# Patient Record
Sex: FEMALE | Race: WHITE | NOT HISPANIC OR LATINO
[De-identification: names, ages, dates, MRNs, and addresses within clinical notes are randomized per-mention and may not be internally consistent; named-entity substitution may affect disease eponyms.]

---

## 2018-11-13 ENCOUNTER — APPOINTMENT (OUTPATIENT)
Dept: ENDOCRINOLOGY | Facility: CLINIC | Age: 78
End: 2018-11-13

## 2018-12-03 ENCOUNTER — LABORATORY RESULT (OUTPATIENT)
Age: 78
End: 2018-12-03

## 2018-12-06 ENCOUNTER — RECORD ABSTRACTING (OUTPATIENT)
Age: 78
End: 2018-12-06

## 2018-12-06 RX ORDER — OMEPRAZOLE 40 MG/1
40 CAPSULE, DELAYED RELEASE ORAL
Refills: 0 | Status: ACTIVE | COMMUNITY

## 2018-12-06 RX ORDER — CALCIUM CITRATE/VITAMIN D3 315MG-6.25
315-250 TABLET ORAL
Refills: 0 | Status: ACTIVE | COMMUNITY

## 2018-12-10 ENCOUNTER — APPOINTMENT (OUTPATIENT)
Dept: ENDOCRINOLOGY | Facility: CLINIC | Age: 78
End: 2018-12-10
Payer: MEDICARE

## 2018-12-10 VITALS
DIASTOLIC BLOOD PRESSURE: 70 MMHG | OXYGEN SATURATION: 98 % | BODY MASS INDEX: 19.69 KG/M2 | SYSTOLIC BLOOD PRESSURE: 120 MMHG | HEART RATE: 89 BPM | WEIGHT: 107 LBS | HEIGHT: 62 IN

## 2018-12-10 LAB — GLUCOSE BLDC GLUCOMTR-MCNC: 261

## 2018-12-10 PROCEDURE — 99214 OFFICE O/P EST MOD 30 MIN: CPT | Mod: 25

## 2018-12-10 PROCEDURE — 82962 GLUCOSE BLOOD TEST: CPT

## 2018-12-10 RX ORDER — PRAVASTATIN SODIUM 10 MG/1
10 TABLET ORAL DAILY
Refills: 0 | Status: DISCONTINUED | COMMUNITY
End: 2018-12-10

## 2019-04-01 ENCOUNTER — LABORATORY RESULT (OUTPATIENT)
Age: 79
End: 2019-04-01

## 2019-04-01 LAB
ALBUMIN SERPL ELPH-MCNC: 4.5 G/DL
ALP BLD-CCNC: 75 U/L
ALT SERPL-CCNC: 13 U/L
ANION GAP SERPL CALC-SCNC: 13 MMOL/L
AST SERPL-CCNC: 20 U/L
BASOPHILS # BLD AUTO: 0.09 K/UL
BASOPHILS NFR BLD AUTO: 1.5 %
BILIRUB SERPL-MCNC: 1 MG/DL
BUN SERPL-MCNC: 21 MG/DL
CALCIUM SERPL-MCNC: 9.9 MG/DL
CHLORIDE SERPL-SCNC: 102 MMOL/L
CHOLEST SERPL-MCNC: 225 MG/DL
CHOLEST/HDLC SERPL: 2.4 RATIO
CO2 SERPL-SCNC: 27 MMOL/L
CREAT SERPL-MCNC: 0.96 MG/DL
EOSINOPHIL # BLD AUTO: 0.14 K/UL
EOSINOPHIL NFR BLD AUTO: 2.4 %
GLUCOSE SERPL-MCNC: 151 MG/DL
HBA1C MFR BLD HPLC: 7 %
HCT VFR BLD CALC: 47.1 %
HDLC SERPL-MCNC: 94 MG/DL
HGB BLD-MCNC: 15.3 G/DL
IMM GRANULOCYTES NFR BLD AUTO: 0.3 %
LDLC SERPL CALC-MCNC: 114 MG/DL
LYMPHOCYTES # BLD AUTO: 1.79 K/UL
LYMPHOCYTES NFR BLD AUTO: 30.1 %
MAN DIFF?: NORMAL
MCHC RBC-ENTMCNC: 30.4 PG
MCHC RBC-ENTMCNC: 32.5 GM/DL
MCV RBC AUTO: 93.5 FL
MONOCYTES # BLD AUTO: 0.41 K/UL
MONOCYTES NFR BLD AUTO: 6.9 %
NEUTROPHILS # BLD AUTO: 3.5 K/UL
NEUTROPHILS NFR BLD AUTO: 58.8 %
PLATELET # BLD AUTO: 231 K/UL
POTASSIUM SERPL-SCNC: 4.2 MMOL/L
PROT SERPL-MCNC: 7.7 G/DL
RBC # BLD: 5.04 M/UL
RBC # FLD: 12.9 %
SODIUM SERPL-SCNC: 142 MMOL/L
T3RU NFR SERPL: 0.9 TBI
T4 SERPL-MCNC: 9.4 UG/DL
TRIGL SERPL-MCNC: 87 MG/DL
TSH SERPL-ACNC: 1.23 UIU/ML
VIT B12 SERPL-MCNC: 578 PG/ML
WBC # FLD AUTO: 5.95 K/UL

## 2019-04-02 LAB
25(OH)D3 SERPL-MCNC: 56.3 NG/ML
CREAT SPEC-SCNC: 146 MG/DL
MICROALBUMIN 24H UR DL<=1MG/L-MCNC: 1.4 MG/DL
MICROALBUMIN/CREAT 24H UR-RTO: 10 MG/G

## 2019-04-09 ENCOUNTER — APPOINTMENT (OUTPATIENT)
Dept: ENDOCRINOLOGY | Facility: CLINIC | Age: 79
End: 2019-04-09

## 2019-04-11 ENCOUNTER — RX RENEWAL (OUTPATIENT)
Age: 79
End: 2019-04-11

## 2019-04-12 ENCOUNTER — MEDICATION RENEWAL (OUTPATIENT)
Age: 79
End: 2019-04-12

## 2019-04-26 ENCOUNTER — APPOINTMENT (OUTPATIENT)
Dept: ENDOCRINOLOGY | Facility: CLINIC | Age: 79
End: 2019-04-26
Payer: MEDICARE

## 2019-04-26 ENCOUNTER — RESULT CHARGE (OUTPATIENT)
Age: 79
End: 2019-04-26

## 2019-04-26 VITALS
SYSTOLIC BLOOD PRESSURE: 120 MMHG | BODY MASS INDEX: 19.51 KG/M2 | HEIGHT: 62 IN | DIASTOLIC BLOOD PRESSURE: 70 MMHG | OXYGEN SATURATION: 98 % | WEIGHT: 106 LBS | HEART RATE: 90 BPM

## 2019-04-26 LAB — GLUCOSE BLDC GLUCOMTR-MCNC: 145

## 2019-04-26 PROCEDURE — 99214 OFFICE O/P EST MOD 30 MIN: CPT | Mod: 25

## 2019-04-26 PROCEDURE — 82962 GLUCOSE BLOOD TEST: CPT

## 2019-04-26 NOTE — HISTORY OF PRESENT ILLNESS
[FreeTextEntry1] : Quality: Type 2 DM\par Severity: controlled\par Duration: over 2 years\par Onset: routine labs\par Modifying Factors: Better with medication \par Associated Symptoms: \par \par Current Regimen:\par Metformin  mg once in am\par \par - Synthroid 125 mcg 1/2 tab - taking appropriate \par \par Self blood sugar monitoring: 3 times a week, no meter, no logs\par per pt. before breakfast blood sugar average 115-120\par \par exercise: walks\par \par Diet:\par B- protein drink, oatmeal with yogurt \par L- sandwich, or goes out\par D- hamburger, chicken, vegetables\par Snacks- fruit, bars\par \par Date of last eye exam: 2018 (-) diabetic retinopathy \par Date of last foot exam: 2019 - last week\par Date of last flu vaccine: 2018\par Date of last Pneumovax: 2019

## 2019-04-26 NOTE — REVIEW OF SYSTEMS
[Heartburn] : heartburn [Fatigue] : no fatigue [Decreased Appetite] : appetite not decreased [Recent Weight Gain (___ Lbs)] : no recent weight gain [Visual Field Defect] : no visual field defect [Recent Weight Loss (___ Lbs)] : no recent weight loss [Blurry Vision] : no blurred vision [Dysphagia] : no dysphagia [Dysphonia] : no dysphonia [Neck Pain] : no neck pain [Chest Pain] : no chest pain [Palpitations] : no palpitations [SOB on Exertion] : no shortness of breath during exertion [Constipation] : no constipation [Diarrhea] : no diarrhea [Polyuria] : no polyuria [Dysuria] : no dysuria [Hair Loss] : no hair loss [Dry Skin] : no dry skin [Headache] : no headaches [Tremors] : no tremors [Depression] : no depression [Anxiety] : no anxiety [Polydipsia] : no polydipsia [Cold Intolerance] : cold tolerant [Heat Intolerance] : heat tolerant [Easy Bruising] : no tendency for easy bruising [Swelling] : no swelling [FreeTextEntry2] : weight stable

## 2019-04-26 NOTE — PHYSICAL EXAM
[Alert] : alert [No Acute Distress] : no acute distress [Well Nourished] : well nourished [Normal Sclera/Conjunctiva] : normal sclera/conjunctiva [EOMI] : extra ocular movement intact [Well Developed] : well developed [Supple] : the neck was supple [Normal Hearing] : hearing was normal [No LAD] : no lymphadenopathy [No Thyroid Nodules] : there were no palpable thyroid nodules [Thyroid Not Enlarged] : the thyroid was not enlarged [Normal Rate and Effort] : normal respiratory rhythm and effort [No Accessory Muscle Use] : no accessory muscle use [Clear to Auscultation] : lungs were clear to auscultation bilaterally [Normal Rate] : heart rate was normal  [Regular Rhythm] : with a regular rhythm [Normal S1, S2] : normal S1 and S2 [Pedal Pulses Normal] : the pedal pulses are present [No Edema] : there was no peripheral edema [Normal Bowel Sounds] : normal bowel sounds [Not Tender] : non-tender [Soft] : abdomen soft [No Rash] : no rash [Normal Gait] : normal gait [Right Foot Was Examined] : right foot ~C was examined [Left Foot Was Examined] : left foot ~C was examined [Normal] : normal [Full ROM] : with full range of motion [No Motor Deficits] : the motor exam was normal [No Tremors] : no tremors [Normal Insight/Judgement] : insight and judgment were intact [Oriented x3] : oriented to person, place, and time [Normal Mood] : the mood was normal [Acanthosis Nigricans] : no acanthosis nigricans [Diminished Throughout Both Feet] : normal tactile sensation with monofilament testing throughout both feet

## 2019-04-26 NOTE — REASON FOR VISIT
[Follow-Up: _____] : a [unfilled] follow-up visit [FreeTextEntry1] : Type 2 DM, Elevated Cholesterol, and Hypothyroidism

## 2019-04-26 NOTE — ASSESSMENT
[FreeTextEntry1] : 79 y/o female with Type 2 DM, Elevated Cholesterol, and Hypothyroidism. Labs reviewed from 4/1/19 - , chol 225, , TSH 1.23, A1C 7.0%, and vitamin D 56\par \par Plan: \par Type 2 DM: continue current medication regimen: Metformin 500 mg daily\par - continue self BS monitoring\par - educated on healthy food choices\par - encouraged to increase routine exercise\par \par Elevated Cholesterol: monitor labs, cholesterol and LDL elevated since last visit \par -  increased Pravastatin to 40 mg daily\par \par Hypothyroidism: monitor labs, continue current dose of Synthroid 125 mcg daily\par \par Labs and follow up visit in 4 months.\par \par Plan reviewed with Dr. Taylor [Importance of Diet and Exercise] : importance of diet and exercise to improve glycemic control, achieve weight loss and improve cardiovascular health

## 2019-04-29 ENCOUNTER — MEDICATION RENEWAL (OUTPATIENT)
Age: 79
End: 2019-04-29

## 2019-08-26 ENCOUNTER — MEDICATION RENEWAL (OUTPATIENT)
Age: 79
End: 2019-08-26

## 2019-08-28 ENCOUNTER — MEDICATION RENEWAL (OUTPATIENT)
Age: 79
End: 2019-08-28

## 2019-10-10 ENCOUNTER — RX RENEWAL (OUTPATIENT)
Age: 79
End: 2019-10-10

## 2019-10-10 ENCOUNTER — MEDICATION RENEWAL (OUTPATIENT)
Age: 79
End: 2019-10-10

## 2019-10-11 ENCOUNTER — MEDICATION RENEWAL (OUTPATIENT)
Age: 79
End: 2019-10-11

## 2019-10-30 ENCOUNTER — MEDICATION RENEWAL (OUTPATIENT)
Age: 79
End: 2019-10-30

## 2019-12-11 ENCOUNTER — LABORATORY RESULT (OUTPATIENT)
Age: 79
End: 2019-12-11

## 2019-12-12 ENCOUNTER — RESULT REVIEW (OUTPATIENT)
Age: 79
End: 2019-12-12

## 2019-12-12 ENCOUNTER — APPOINTMENT (OUTPATIENT)
Dept: ENDOCRINOLOGY | Facility: CLINIC | Age: 79
End: 2019-12-12
Payer: MEDICARE

## 2019-12-12 VITALS
HEART RATE: 89 BPM | SYSTOLIC BLOOD PRESSURE: 120 MMHG | HEIGHT: 62 IN | DIASTOLIC BLOOD PRESSURE: 70 MMHG | WEIGHT: 107 LBS | BODY MASS INDEX: 19.69 KG/M2 | OXYGEN SATURATION: 97 %

## 2019-12-12 LAB
ALBUMIN SERPL ELPH-MCNC: 4.4 G/DL
ALP BLD-CCNC: 76 U/L
ALT SERPL-CCNC: 11 U/L
ANION GAP SERPL CALC-SCNC: 12 MMOL/L
AST SERPL-CCNC: 13 U/L
BASOPHILS # BLD AUTO: 0.06 K/UL
BASOPHILS NFR BLD AUTO: 0.6 %
BILIRUB SERPL-MCNC: 0.5 MG/DL
BUN SERPL-MCNC: 36 MG/DL
CALCIUM SERPL-MCNC: 10.3 MG/DL
CHLORIDE SERPL-SCNC: 102 MMOL/L
CHOLEST SERPL-MCNC: 206 MG/DL
CHOLEST/HDLC SERPL: 2.7 RATIO
CO2 SERPL-SCNC: 27 MMOL/L
CREAT SERPL-MCNC: 1.05 MG/DL
CREAT SPEC-SCNC: 85 MG/DL
EOSINOPHIL # BLD AUTO: 0.11 K/UL
EOSINOPHIL NFR BLD AUTO: 1.1 %
ESTIMATED AVERAGE GLUCOSE: 151 MG/DL
GLUCOSE BLDC GLUCOMTR-MCNC: 184
GLUCOSE SERPL-MCNC: 128 MG/DL
HBA1C MFR BLD HPLC: 6.9 %
HCT VFR BLD CALC: 45.3 %
HDLC SERPL-MCNC: 77 MG/DL
HGB BLD-MCNC: 14.6 G/DL
IMM GRANULOCYTES NFR BLD AUTO: 0.3 %
LDLC SERPL CALC-MCNC: 105 MG/DL
LYMPHOCYTES # BLD AUTO: 1.98 K/UL
LYMPHOCYTES NFR BLD AUTO: 20.1 %
MAN DIFF?: NORMAL
MCHC RBC-ENTMCNC: 30.4 PG
MCHC RBC-ENTMCNC: 32.2 GM/DL
MCV RBC AUTO: 94.2 FL
MICROALBUMIN 24H UR DL<=1MG/L-MCNC: <1.2 MG/DL
MICROALBUMIN/CREAT 24H UR-RTO: NORMAL MG/G
MONOCYTES # BLD AUTO: 0.54 K/UL
MONOCYTES NFR BLD AUTO: 5.5 %
NEUTROPHILS # BLD AUTO: 7.15 K/UL
NEUTROPHILS NFR BLD AUTO: 72.4 %
PLATELET # BLD AUTO: 252 K/UL
POTASSIUM SERPL-SCNC: 4.9 MMOL/L
PROT SERPL-MCNC: 6.9 G/DL
RBC # BLD: 4.81 M/UL
RBC # FLD: 12.4 %
SODIUM SERPL-SCNC: 141 MMOL/L
T3FREE SERPL-MCNC: 2.33 PG/ML
T3RU NFR SERPL: 1 TBI
T4 FREE SERPL-MCNC: 1.3 NG/DL
TRIGL SERPL-MCNC: 120 MG/DL
TSH SERPL-ACNC: 2.24 UIU/ML
WBC # FLD AUTO: 9.87 K/UL

## 2019-12-12 PROCEDURE — 82962 GLUCOSE BLOOD TEST: CPT

## 2019-12-12 PROCEDURE — 99214 OFFICE O/P EST MOD 30 MIN: CPT | Mod: 25

## 2019-12-12 NOTE — HISTORY OF PRESENT ILLNESS
[FreeTextEntry1] : Pt. reports that a lung mass was found on CT scan and has a scheduled appointment with lung specialist. She also had a brain MRI, PET scan and Echo done - all results are pending. \par \par Quality: Type 2 DM\par Severity: controlled\par Duration: over 2 years\par Onset: routine labs\par Modifying Factors: Better with medication \par Associated Symptoms: \par \par Current Regimen:\par Metformin  mg once in am\par \par - Synthroid 125 mcg (VINNY) 1/2 tab - taking appropriate \par \par Self blood sugar monitoring: 3 times a week, no meter, no logs\par per pt. before breakfast blood sugar average 115- 120\par   today \par \par exercise: walks not as much in the winter \par \par Diet:\par B- protein drink, oatmeal with yogurt \par L- sandwich, or goes out\par D- hamburger, chicken, vegetables\par Snacks- fruit, bars\par \par Date of last eye exam: 3 months ago (-) diabetic retinopathy \par Date of last foot exam: 2019 - appointment tomorrow with podiatry - Dr. Escalera \par Date of last flu vaccine: 2019\par Date of last Pneumovax: 2019

## 2019-12-12 NOTE — REVIEW OF SYSTEMS
[Cold Intolerance] : cold intolerant [Recent Weight Gain (___ Lbs)] : no recent weight gain [Decreased Appetite] : appetite not decreased [Fatigue] : no fatigue [Blurry Vision] : no blurred vision [Recent Weight Loss (___ Lbs)] : no recent weight loss [Visual Field Defect] : no visual field defect [Dysphagia] : no dysphagia [Dysphonia] : no dysphonia [Neck Pain] : no neck pain [Chest Pain] : no chest pain [Palpitations] : no palpitations [Diarrhea] : no diarrhea [Constipation] : no constipation [Dysuria] : no dysuria [Polyuria] : no polyuria [Hair Loss] : no hair loss [Dry Skin] : no dry skin [Headache] : no headaches [Tremors] : no tremors [Polydipsia] : no polydipsia [Depression] : no depression [Anxiety] : no anxiety [Heat Intolerance] : heat tolerant [Easy Bruising] : no tendency for easy bruising [Swelling] : no swelling [FreeTextEntry2] : weight stable

## 2019-12-12 NOTE — REASON FOR VISIT
[Follow-Up: _____] : a [unfilled] follow-up visit [Other: _____] : [unfilled] [FreeTextEntry1] : Type 2 DM, Elevated Cholesterol, and Hypothyroidism

## 2019-12-12 NOTE — ASSESSMENT
[FreeTextEntry1] : 80 y/o female with Type 2 DM, Elevated Cholesterol, and Hypothyroidism. Labs reviewed from 12/11/19 - , chol 206, , TSH 2.2, A1C 6.9%, and BUN 36\par \par Plan: \par Follow up with lung specialist r/t lung mass\par \par Type 2 DM: controlled - not a candidate for tight glycemic control based on advanced age \par - continue current medication regimen: Metformin 500 mg daily\par - continue self BS monitoring\par - educated on healthy food choices\par - encouraged to increase routine exercise\par \par Elevated Cholesterol: monitor labs, cholesterol and LDL have improved slightly since last visit \par -  continue Pravastatin to 40 mg daily\par \par Hypothyroidism: euthyroid clinically and biochemically on replacement\par - continue current dose of Synthroid 125 mcg daily\par \par BUN elevated - encouraged to drink more water\par  \par Labs and follow up visit in 3- 4 months.

## 2019-12-12 NOTE — PHYSICAL EXAM
[Alert] : alert [No Acute Distress] : no acute distress [Normal Sclera/Conjunctiva] : normal sclera/conjunctiva [Well Developed] : well developed [Well Nourished] : well nourished [Supple] : the neck was supple [EOMI] : extra ocular movement intact [Normal Hearing] : hearing was normal [Thyroid Not Enlarged] : the thyroid was not enlarged [No Thyroid Nodules] : there were no palpable thyroid nodules [No LAD] : no lymphadenopathy [No Accessory Muscle Use] : no accessory muscle use [Normal Rate and Effort] : normal respiratory rhythm and effort [Clear to Auscultation] : lungs were clear to auscultation bilaterally [Normal Rate] : heart rate was normal  [Regular Rhythm] : with a regular rhythm [Normal S1, S2] : normal S1 and S2 [No Edema] : there was no peripheral edema [Pedal Pulses Normal] : the pedal pulses are present [Not Tender] : non-tender [Normal Bowel Sounds] : normal bowel sounds [Normal Gait] : normal gait [Soft] : abdomen soft [Right Foot Was Examined] : right foot ~C was examined [Acanthosis Nigricans] : no acanthosis nigricans [No Rash] : no rash [Left Foot Was Examined] : left foot ~C was examined [Normal] : normal [No Motor Deficits] : the motor exam was normal [Diminished Throughout Both Feet] : normal tactile sensation with monofilament testing throughout both feet [Full ROM] : with full range of motion [No Tremors] : no tremors [Oriented x3] : oriented to person, place, and time [Normal Mood] : the mood was normal [Normal Insight/Judgement] : insight and judgment were intact

## 2020-03-09 ENCOUNTER — LABORATORY RESULT (OUTPATIENT)
Age: 80
End: 2020-03-09

## 2020-03-13 ENCOUNTER — APPOINTMENT (OUTPATIENT)
Dept: ENDOCRINOLOGY | Facility: CLINIC | Age: 80
End: 2020-03-13
Payer: MEDICARE

## 2020-03-13 VITALS
BODY MASS INDEX: 18.95 KG/M2 | HEIGHT: 62 IN | OXYGEN SATURATION: 97 % | HEART RATE: 86 BPM | DIASTOLIC BLOOD PRESSURE: 70 MMHG | WEIGHT: 103 LBS | SYSTOLIC BLOOD PRESSURE: 102 MMHG

## 2020-03-13 LAB — GLUCOSE BLDC GLUCOMTR-MCNC: 211

## 2020-03-13 PROCEDURE — 82962 GLUCOSE BLOOD TEST: CPT

## 2020-03-13 PROCEDURE — 99214 OFFICE O/P EST MOD 30 MIN: CPT

## 2020-03-13 NOTE — HISTORY OF PRESENT ILLNESS
[FreeTextEntry1] : Pt. reports that a lung mass was found on CT scan and has a scheduled appointment with lung specialist. She also had a brain MRI, PET scan and Echo done. PET scan was negative, CT scan had no concerning findings. ECHO negative. Lung finding reading as possible scarring and no mass currently.  Brain MRI was wnl. \par \par Quality: Type 2 DM\par Severity: controlled\par Duration: over 2 years\par Onset: routine labs\par Modifying Factors: Better with medication \par Associated Symptoms: \par \par Current Regimen:\par Metformin  mg once in am\par \par - Synthroid 125 mcg (VINNY) 1/2 tab - taking appropriate \par \par Self blood sugar monitoring: 3 times a week, no meter, no logs\par per pt. before breakfast blood sugar average 120s \par Current  - ate 20 minutes ago \par \par exercise: walks not as much in the winter \par \par Diet: same \par B- protein drink, oatmeal with yogurt, eggs \par L- sandwich, or goes out\par D- hamburger, chicken, vegetables\par Snacks- fruit, bars\par \par Date of last eye exam: 1 year ago (-) diabetic retinopathy, will schedule appointment  \par Date of last foot exam: 3/2020 with podiatry - Dr. Escalera \par Date of last flu vaccine: 2019\par Date of last Pneumovax: 2019

## 2020-03-13 NOTE — REVIEW OF SYSTEMS
[Fatigue] : no fatigue [Decreased Appetite] : appetite not decreased [Recent Weight Gain (___ Lbs)] : recent [unfilled] ~Ulb weight gain [Visual Field Defect] : no visual field defect [Blurry Vision] : no blurred vision [Dysphagia] : no dysphagia [Dysphonia] : no dysphonia [Neck Pain] : no neck pain [Chest Pain] : no chest pain [Palpitations] : no palpitations [Constipation] : no constipation [Diarrhea] : no diarrhea [Polyuria] : no polyuria [Dysuria] : no dysuria [Hair Loss] : no hair loss [Dry Skin] : no dry skin [Headache] : no headaches [Tremors] : no tremors [Depression] : no depression [Anxiety] : anxiety [Polydipsia] : no polydipsia [Cold Intolerance] : cold tolerant [Heat Intolerance] : heat tolerant [Easy Bruising] : no tendency for easy bruising [Swelling] : no swelling [de-identified] : sometimes

## 2020-03-13 NOTE — ASSESSMENT
[FreeTextEntry1] : 80 y/o female with Type 2 DM, Elevated Cholesterol, and Hypothyroidism. Labs reviewed from 3/9/20 - , chol 214, , TSH 1.41, and A1C 7.1%\par \par Plan: \par Type 2 DM: controlled - not a candidate for tight glycemic control based on advanced age \par - continue current medication regimen: Metformin 500 mg daily\par - continue self blood sugars monitoring\par - educated on healthy food choices\par - encouraged to increase routine exercise\par \par Elevated Cholesterol: monitor labs, cholesterol and LDL are stable  \par - continue Pravastatin 40 mg daily\par \par Hypothyroidism: euthyroid clinically and biochemically on replacement\par - continue current dose of Synthroid 125 mcg half a tab daily \par  \par Labs and follow up visit in 3 months.

## 2020-03-13 NOTE — PHYSICAL EXAM
[Alert] : alert [No Acute Distress] : no acute distress [Well Nourished] : well nourished [Well Developed] : well developed [Normal Sclera/Conjunctiva] : normal sclera/conjunctiva [EOMI] : extra ocular movement intact [Normal Hearing] : hearing was normal [Supple] : the neck was supple [No LAD] : no lymphadenopathy [Thyroid Not Enlarged] : the thyroid was not enlarged [No Thyroid Nodules] : there were no palpable thyroid nodules [Normal Rate and Effort] : normal respiratory rhythm and effort [No Accessory Muscle Use] : no accessory muscle use [Clear to Auscultation] : lungs were clear to auscultation bilaterally [Normal Rate] : heart rate was normal  [Normal S1, S2] : normal S1 and S2 [Regular Rhythm] : with a regular rhythm [Pedal Pulses Normal] : the pedal pulses are present [No Edema] : there was no peripheral edema [Normal Bowel Sounds] : normal bowel sounds [Not Tender] : non-tender [Soft] : abdomen soft [Normal Gait] : normal gait [No Rash] : no rash [Foot Ulcers] : no foot ulcers [Acanthosis Nigricans] : no acanthosis nigricans [Right Foot Was Examined] : right foot ~C was examined [Left Foot Was Examined] : left foot ~C was examined [Normal] : normal [Full ROM] : with full range of motion [Diminished Throughout Both Feet] : normal tactile sensation with monofilament testing throughout both feet [No Motor Deficits] : the motor exam was normal [No Tremors] : no tremors [Oriented x3] : oriented to person, place, and time [Normal Insight/Judgement] : insight and judgment were intact [Normal Mood] : the mood was normal

## 2020-06-11 ENCOUNTER — APPOINTMENT (OUTPATIENT)
Dept: ENDOCRINOLOGY | Facility: CLINIC | Age: 80
End: 2020-06-11

## 2021-07-16 ENCOUNTER — APPOINTMENT (OUTPATIENT)
Dept: ENDOCRINOLOGY | Facility: CLINIC | Age: 81
End: 2021-07-16
Payer: MEDICARE

## 2021-07-16 VITALS
BODY MASS INDEX: 19.88 KG/M2 | SYSTOLIC BLOOD PRESSURE: 132 MMHG | WEIGHT: 108 LBS | OXYGEN SATURATION: 98 % | HEIGHT: 62 IN | DIASTOLIC BLOOD PRESSURE: 60 MMHG | HEART RATE: 95 BPM

## 2021-07-16 LAB — GLUCOSE BLDC GLUCOMTR-MCNC: 243

## 2021-07-16 PROCEDURE — 82962 GLUCOSE BLOOD TEST: CPT

## 2021-07-16 PROCEDURE — 99214 OFFICE O/P EST MOD 30 MIN: CPT | Mod: 25

## 2021-07-16 NOTE — REASON FOR VISIT
[Follow - Up] : a follow-up visit [DM Type 2] : DM Type 2 [Hypothyroidism] : hypothyroidism [Other___] : [unfilled] [Other: _____] : [unfilled]

## 2021-07-16 NOTE — REVIEW OF SYSTEMS
[Recent Weight Gain (___ Lbs)] : recent weight gain: [unfilled] lbs [Fatigue] : no fatigue [Decreased Appetite] : appetite not decreased [Visual Field Defect] : no visual field defect [Blurred Vision] : no blurred vision [Dysphagia] : no dysphagia [Neck Pain] : no neck pain [Dysphonia] : no dysphonia [Chest Pain] : no chest pain [Palpitations] : no palpitations [Constipation] : no constipation [Diarrhea] : no diarrhea [Polyuria] : no polyuria [Dysuria] : no dysuria [Dry Skin] : no dry skin [Hair Loss] : no hair loss [Headaches] : no headaches [Tremors] : no tremors [Depression] : no depression [Anxiety] : no anxiety [Polydipsia] : no polydipsia [Cold Intolerance] : no cold intolerance [Heat Intolerance] : no heat intolerance [Swelling] : no swelling

## 2021-07-16 NOTE — ASSESSMENT
[FreeTextEntry1] : 80 y/o female with Type 2 DM, Elevated Cholesterol, and Hypothyroidism.\par \par Plan: \par Type 2 DM: controlled - not a candidate for tight glycemic control based on advanced age \par - continue current medication regimen: Metformin 500 mg daily\par - continue self blood sugars monitoring\par - educated on healthy food choices\par - encouraged to increase routine exercise\par - repeat A1C in 3 months \par \par Elevated Cholesterol: recently changed to Rosuvastatin \par - continue Rosuvastatin 10 mg daily - per PMD\par - repeat lipids in 3 months \par \par Hypothyroidism: euthyroid clinically and biochemically on replacement\par - continue current dose of Synthroid 125 mcg half a tab daily\par - repeat TFTs in 3 months \par  \par RTO in 3 months. [Levothyroxine] : The patient was instructed to take Levothyroxine on an empty stomach, separate from vitamins, and wait at least 30 minutes before eating

## 2021-07-16 NOTE — PHYSICAL EXAM
[Alert] : alert [Well Nourished] : well nourished [No Acute Distress] : no acute distress [Well Developed] : well developed [Normal Sclera/Conjunctiva] : normal sclera/conjunctiva [EOMI] : extra ocular movement intact [No Neck Mass] : no neck mass was observed [Thyroid Not Enlarged] : the thyroid was not enlarged [No Thyroid Nodules] : no palpable thyroid nodules [No Respiratory Distress] : no respiratory distress [No Accessory Muscle Use] : no accessory muscle use [Normal Rate and Effort] : normal respiratory rate and effort [Clear to Auscultation] : lungs were clear to auscultation bilaterally [Normal S1, S2] : normal S1 and S2 [Normal Rate] : heart rate was normal [Regular Rhythm] : with a regular rhythm [No Edema] : no peripheral edema [Normal Bowel Sounds] : normal bowel sounds [Not Tender] : non-tender [Soft] : abdomen soft [Normal Gait] : normal gait [No Rash] : no rash [Acanthosis Nigricans] : no acanthosis nigricans [Foot Ulcers] : no foot ulcers [Right Foot Was Examined] : right foot ~C was examined [Left Foot Was Examined] : left foot ~C was examined [Normal] : normal [Diminished Throughout Both Feet] : normal tactile sensation with monofilament testing throughout both feet [No Tremors] : no tremors [Oriented x3] : oriented to person, place, and time [Normal Affect] : the affect was normal [Normal Insight/Judgement] : insight and judgment were intact [Normal Mood] : the mood was normal

## 2021-07-16 NOTE — HISTORY OF PRESENT ILLNESS
[FreeTextEntry1] : History: lung mass was found on CT scan and has a scheduled appointment with lung specialist. She also had a brain MRI, PET scan and Echo done. PET scan was negative, CT scan had no concerning findings. ECHO negative. Lung finding reading as possible scarring and no mass currently.  Brain MRI was wnl. \par \par Interval History: no recent illness or hospitalizations \par \par Quality: Type 2 DM\par Severity: controlled\par Duration: over 2 years\par Onset: routine labs\par Modifying Factors: Better with medication \par Associated Symptoms: \par \par Current Regimen:\par Metformin  mg once in am\par \par - Synthroid 125 mcg (VINNY) 1/2 tab - taking appropriate \par \par Self blood sugar monitoring: 3 times a week, no meter, no logs\par per pt. before breakfast blood sugar average 140s\par Current  - after eating half a bagal and oatmeal \par \par A1C 7.3\par \par exercise: walks not as much in the winter \par \par Diet: same \par B- protein drink, oatmeal with yogurt, eggs \par L- sandwich, or goes out\par D- hamburger, chicken, vegetables\par Snacks- fruit, bars\par \par Date of last eye exam: 1 year ago (-) diabetic retinopathy, will schedule appointment  \par Date of last foot exam: 3/2020 with podiatry - Dr. Escalera, appointment tomorrow \par Date of last flu vaccine: 2020\par Date of last Pneumovax: 2019

## 2021-12-23 LAB — HBA1C MFR BLD HPLC: 7

## 2021-12-28 ENCOUNTER — APPOINTMENT (OUTPATIENT)
Dept: ENDOCRINOLOGY | Facility: CLINIC | Age: 81
End: 2021-12-28

## 2022-03-28 ENCOUNTER — APPOINTMENT (OUTPATIENT)
Dept: ENDOCRINOLOGY | Facility: CLINIC | Age: 82
End: 2022-03-28
Payer: MEDICARE

## 2022-03-28 VITALS
OXYGEN SATURATION: 98 % | WEIGHT: 107 LBS | BODY MASS INDEX: 19.69 KG/M2 | HEART RATE: 94 BPM | DIASTOLIC BLOOD PRESSURE: 82 MMHG | SYSTOLIC BLOOD PRESSURE: 142 MMHG | HEIGHT: 62 IN

## 2022-03-28 LAB
GLUCOSE BLDC GLUCOMTR-MCNC: 106
HBA1C MFR BLD HPLC: 7.1

## 2022-03-28 PROCEDURE — 83036 HEMOGLOBIN GLYCOSYLATED A1C: CPT | Mod: QW

## 2022-03-28 PROCEDURE — 82962 GLUCOSE BLOOD TEST: CPT

## 2022-03-28 PROCEDURE — 99214 OFFICE O/P EST MOD 30 MIN: CPT | Mod: 25

## 2022-03-28 RX ORDER — OMEGA-3/DHA/EPA/FISH OIL 300-1000MG
CAPSULE ORAL
Refills: 0 | Status: ACTIVE | COMMUNITY

## 2022-03-28 RX ORDER — PRAVASTATIN SODIUM 40 MG/1
40 TABLET ORAL
Qty: 90 | Refills: 2 | Status: DISCONTINUED | COMMUNITY
Start: 2018-12-10 | End: 2022-03-28

## 2022-03-28 RX ORDER — ROSUVASTATIN CALCIUM 20 MG/1
20 TABLET, FILM COATED ORAL
Qty: 90 | Refills: 0 | Status: ACTIVE | COMMUNITY
Start: 2022-03-28 | End: 1900-01-01

## 2022-03-28 NOTE — PHYSICAL EXAM
[Alert] : alert [Well Nourished] : well nourished [No Acute Distress] : no acute distress [Well Developed] : well developed [Normal Sclera/Conjunctiva] : normal sclera/conjunctiva [No Proptosis] : no proptosis [No Neck Mass] : no neck mass was observed [Thyroid Not Enlarged] : the thyroid was not enlarged [No Thyroid Nodules] : no palpable thyroid nodules [No Respiratory Distress] : no respiratory distress [No Accessory Muscle Use] : no accessory muscle use [Normal Rate and Effort] : normal respiratory rate and effort [Clear to Auscultation] : lungs were clear to auscultation bilaterally [Normal S1, S2] : normal S1 and S2 [Normal Rate] : heart rate was normal [Regular Rhythm] : with a regular rhythm [No Edema] : no peripheral edema [Normal Bowel Sounds] : normal bowel sounds [Not Tender] : non-tender [Soft] : abdomen soft [No Stigmata of Cushings Syndrome] : no stigmata of Cushings Syndrome [Normal Gait] : normal gait [No Rash] : no rash [Right foot was examined, including] : right foot ~C was examined, including visual inspection with sensory and pulse exams [Left foot was examined, including] : left foot ~C was examined, including visual inspection with sensory and pulse exams [Normal] : normal [No Tremors] : no tremors [Oriented x3] : oriented to person, place, and time [Normal Affect] : the affect was normal [Normal Insight/Judgement] : insight and judgment were intact [Normal Mood] : the mood was normal [Acanthosis Nigricans] : no acanthosis nigricans [Foot Ulcers] : no foot ulcers [Diminished Throughout Both Feet] : normal tactile sensation with monofilament testing throughout both feet [de-identified] : thin appearance

## 2022-03-28 NOTE — HISTORY OF PRESENT ILLNESS
[FreeTextEntry1] : History: lung mass was found on CT scan and has a scheduled appointment with lung specialist. She also had a brain MRI, PET scan and Echo done. PET scan was negative, CT scan had no concerning findings. ECHO negative. Lung finding reading as possible scarring and no mass currently.  Brain MRI was wnl. \par \par Interval History: no recent illness or hospitalizations \par \par Quality: Type 2 DM\par Severity: controlled\par Duration: over 2 years\par Onset: routine labs\par Modifying Factors: Better with medication \par Associated Symptoms: \par \par Current Regimen:\par Metformin  mg once in am\par \par - Synthroid 125 mcg (VINNY) 1/2 tab - taking appropriate \par \par Self blood sugar monitoring: 3 times a week, no meter, no logs\par per pt. before breakfast blood sugar average 114-122\par Current  - had protein shake 15 minutes ago\par \par Today A1C 7.1\par \par exercise: walks not as much in the winter, walks up and down the stairs daily \par \par Diet: same \par B- protein drink, oatmeal with yogurt, eggs \par L- sandwich, or goes out\par D- hamburger, chicken, vegetables\par Snacks- fruit, bars\par \par Date of last eye exam: 1 month ago (-) diabetic retinopathy\par Date of last foot exam: 3/2020 with podiatry - Dr. Escalera, appointment tomorrow \par Date of last flu vaccine: 2021\par Date of last Pneumovax: 2019

## 2022-03-28 NOTE — ASSESSMENT
[FreeTextEntry1] : 82 y/o female with Type 2 DM, Elevated Cholesterol, and Hypothyroidism.\par \par Plan: \par Type 2 DM: controlled - not a candidate for tight glycemic control based on advanced age \par - continue current medication regimen: Metformin 500 mg daily\par - continue self blood sugars monitoring\par - educated on healthy food choices\par - encouraged to increase routine exercise\par - repeat A1C in 4 months \par \par Elevated Cholesterol: needs updated lipids\par - continue Rosuvastatin 20 mg daily - per PMD\par \par Hypothyroidism: euthyroid clinically on replacement, needs updated TFTs done, pt. will get them done with PMD\par - continue current dose of Synthroid 125 mcg half a tab daily\par  \par RTO in 4 months.

## 2022-03-28 NOTE — REVIEW OF SYSTEMS
[Heartburn] : heartburn [Fatigue] : no fatigue [Decreased Appetite] : appetite not decreased [Recent Weight Gain (___ Lbs)] : no recent weight gain [Recent Weight Loss (___ Lbs)] : no recent weight loss [Visual Field Defect] : no visual field defect [Blurred Vision] : no blurred vision [Dysphagia] : no dysphagia [Neck Pain] : no neck pain [Dysphonia] : no dysphonia [Chest Pain] : no chest pain [Palpitations] : no palpitations [Constipation] : no constipation [Diarrhea] : no diarrhea [Polyuria] : no polyuria [Dysuria] : no dysuria [Headaches] : no headaches [Tremors] : no tremors [Depression] : no depression [Anxiety] : no anxiety [Polydipsia] : no polydipsia [Swelling] : no swelling [FreeTextEntry2] : weight stable

## 2022-07-11 ENCOUNTER — APPOINTMENT (OUTPATIENT)
Dept: ENDOCRINOLOGY | Facility: CLINIC | Age: 82
End: 2022-07-11

## 2022-08-19 ENCOUNTER — APPOINTMENT (OUTPATIENT)
Dept: ENDOCRINOLOGY | Facility: CLINIC | Age: 82
End: 2022-08-19

## 2022-08-19 VITALS
WEIGHT: 104 LBS | DIASTOLIC BLOOD PRESSURE: 62 MMHG | HEIGHT: 62 IN | SYSTOLIC BLOOD PRESSURE: 116 MMHG | HEART RATE: 102 BPM | OXYGEN SATURATION: 97 % | BODY MASS INDEX: 19.14 KG/M2

## 2022-08-19 DIAGNOSIS — E78.00 PURE HYPERCHOLESTEROLEMIA, UNSPECIFIED: ICD-10-CM

## 2022-08-19 DIAGNOSIS — Z79.899 OTHER LONG TERM (CURRENT) DRUG THERAPY: ICD-10-CM

## 2022-08-19 LAB
GLUCOSE BLDC GLUCOMTR-MCNC: 205
HBA1C MFR BLD HPLC: 7.3
TSH SERPL-ACNC: 1.39

## 2022-08-19 PROCEDURE — 99214 OFFICE O/P EST MOD 30 MIN: CPT | Mod: 25

## 2022-08-19 PROCEDURE — 82962 GLUCOSE BLOOD TEST: CPT

## 2022-08-19 NOTE — REVIEW OF SYSTEMS
[Recent Weight Loss (___ Lbs)] : recent weight loss: [unfilled] lbs [Anxiety] : anxiety [Fatigue] : no fatigue [Decreased Appetite] : appetite not decreased [Visual Field Defect] : no visual field defect [Blurred Vision] : no blurred vision [Dysphagia] : no dysphagia [Neck Pain] : no neck pain [Dysphonia] : no dysphonia [Chest Pain] : no chest pain [Palpitations] : no palpitations [Constipation] : no constipation [Diarrhea] : no diarrhea [Polyuria] : no polyuria [Dysuria] : no dysuria [Dry Skin] : no dry skin [Hair Loss] : no hair loss [Headaches] : no headaches [Tremors] : no tremors [Depression] : no depression [Polydipsia] : no polydipsia [Cold Intolerance] : no cold intolerance [Heat Intolerance] : no heat intolerance [Swelling] : no swelling

## 2022-08-19 NOTE — ASSESSMENT
[FreeTextEntry1] : 83 y/o female with Type 2 DM, Elevated Cholesterol, and Hypothyroidism.\par \par Plan: \par Type 2 DM: controlled - not a candidate for tight glycemic control based on advanced age \par - continue current medication regimen: Metformin 500 mg daily\par - continue self blood sugars monitoring\par - educated on healthy food choices\par - encouraged to increase routine exercise\par - repeat A1C in 4 months \par \par Elevated Cholesterol: needs updated lipids\par - continue Rosuvastatin 20 mg daily - per PMD\par \par Hypothyroidism: euthyroid clinically on replacement, needs updated TFTs done, pt. will get them done with PMD\par - continue current dose of Synthroid 125 mcg half a tab daily\par  \par RTO in 4 months in Dr Taylor  [Levothyroxine] : The patient was instructed to take Levothyroxine on an empty stomach, separate from vitamins, and wait at least 30 minutes before eating

## 2022-08-19 NOTE — PHYSICAL EXAM
[Alert] : alert [No Acute Distress] : no acute distress [Normal Sclera/Conjunctiva] : normal sclera/conjunctiva [No Proptosis] : no proptosis [No LAD] : no lymphadenopathy [Thyroid Not Enlarged] : the thyroid was not enlarged [No Thyroid Nodules] : no palpable thyroid nodules [No Respiratory Distress] : no respiratory distress [No Accessory Muscle Use] : no accessory muscle use [Normal Rate and Effort] : normal respiratory rate and effort [Clear to Auscultation] : lungs were clear to auscultation bilaterally [Normal S1, S2] : normal S1 and S2 [Normal Rate] : heart rate was normal [Regular Rhythm] : with a regular rhythm [No Edema] : no peripheral edema [Pedal Pulses Normal] : the pedal pulses are present [Normal Bowel Sounds] : normal bowel sounds [Not Tender] : non-tender [Soft] : abdomen soft [No Stigmata of Cushings Syndrome] : no stigmata of Cushings Syndrome [Normal Gait] : normal gait [No Rash] : no rash [Acanthosis Nigricans] : no acanthosis nigricans [Foot Ulcers] : no foot ulcers [Right foot was examined, including] : right foot ~C was examined, including visual inspection with sensory and pulse exams [Left foot was examined, including] : left foot ~C was examined, including visual inspection with sensory and pulse exams [Normal] : normal [Diminished Throughout Both Feet] : normal tactile sensation with monofilament testing throughout both feet [No Tremors] : no tremors [Oriented x3] : oriented to person, place, and time [Normal Affect] : the affect was normal [Normal Insight/Judgement] : insight and judgment were intact [Normal Mood] : the mood was normal [de-identified] : thin appearance  [de-identified] : wears hearing aides

## 2022-08-19 NOTE — HISTORY OF PRESENT ILLNESS
[FreeTextEntry1] : History: lung mass was found on CT scan and has a scheduled appointment with lung specialist. She also had a brain MRI, PET scan and Echo done. PET scan was negative, CT scan had no concerning findings. ECHO negative. Lung finding reading as possible scarring and no mass currently.  Brain MRI was wnl. \par \par Interval History: patient reports last week she had a stomach virus and was having diarrhea. Since then symptoms have resolved. \par \par Quality: Type 2 DM\par Severity: controlled\par Duration: over 2 years\par Onset: routine labs\par Modifying Factors: Better with medication \par Associated Symptoms: \par \par Current Regimen:\par Metformin  mg once in am\par \par - Synthroid 125 mcg (VINNY) 1/2 tab - taking appropriate \par 6/27/22 TSH 1.3, Free T4 1.1\par \par Self blood sugar monitoring: 3 times a week, no meter, no logs\par per pt. before breakfast blood sugar average 125-130\par Current  - ate bagel \par \par 6/27/22 A1C 7.3\par \par exercise: walks not as much in the winter, walks up and down the stairs daily \par \par Diet: same \par B- protein drink, oatmeal with yogurt, eggs \par L- sandwich, or goes out\par D- hamburger, chicken, vegetables\par Snacks- fruit, bars\par \par Date of last eye exam: 6 months ago (-) diabetic retinopathy\par Date of last foot exam: today seen podiatry - Dr. Escalera\par Date of last flu vaccine: 2021\par Date of last Pneumovax: 2019

## 2022-10-28 ENCOUNTER — NON-APPOINTMENT (OUTPATIENT)
Age: 82
End: 2022-10-28

## 2023-01-23 ENCOUNTER — APPOINTMENT (OUTPATIENT)
Dept: ENDOCRINOLOGY | Facility: CLINIC | Age: 83
End: 2023-01-23

## 2023-04-12 ENCOUNTER — APPOINTMENT (OUTPATIENT)
Dept: ENDOCRINOLOGY | Facility: CLINIC | Age: 83
End: 2023-04-12
Payer: MEDICARE

## 2023-04-12 VITALS
OXYGEN SATURATION: 99 % | DIASTOLIC BLOOD PRESSURE: 50 MMHG | HEIGHT: 62 IN | SYSTOLIC BLOOD PRESSURE: 96 MMHG | WEIGHT: 88 LBS | BODY MASS INDEX: 16.2 KG/M2 | HEART RATE: 98 BPM

## 2023-04-12 LAB — GLUCOSE BLDC GLUCOMTR-MCNC: 175

## 2023-04-12 PROCEDURE — 82962 GLUCOSE BLOOD TEST: CPT

## 2023-04-12 PROCEDURE — 99214 OFFICE O/P EST MOD 30 MIN: CPT | Mod: 25

## 2023-04-12 NOTE — ASSESSMENT
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

## 2023-04-12 NOTE — PHYSICAL EXAM
[Alert] : alert [No Acute Distress] : no acute distress [Thyroid Not Enlarged] : the thyroid was not enlarged [Normal Rate] : heart rate was normal [Regular Rhythm] : with a regular rhythm [de-identified] : thin, signs of weight loss

## 2023-11-10 RX ORDER — BLOOD SUGAR DIAGNOSTIC
STRIP MISCELLANEOUS
Qty: 1 | Refills: 1 | Status: COMPLETED | COMMUNITY
Start: 2019-04-29 | End: 2023-11-10

## 2023-11-10 RX ORDER — BLOOD-GLUCOSE CONTROL, LOW
EACH MISCELLANEOUS
Qty: 100 | Refills: 0 | Status: ACTIVE | COMMUNITY
Start: 2023-11-10 | End: 1900-01-01

## 2023-11-10 RX ORDER — LANCETS 33 GAUGE
EACH MISCELLANEOUS
Qty: 1 | Refills: 1 | Status: COMPLETED | COMMUNITY
Start: 2019-04-29 | End: 2023-11-10

## 2023-11-10 RX ORDER — BLOOD SUGAR DIAGNOSTIC
STRIP MISCELLANEOUS
Qty: 100 | Refills: 0 | Status: ACTIVE | COMMUNITY
Start: 2023-11-10 | End: 1900-01-01

## 2023-11-17 ENCOUNTER — APPOINTMENT (OUTPATIENT)
Dept: ENDOCRINOLOGY | Facility: CLINIC | Age: 83
End: 2023-11-17
Payer: MEDICARE

## 2023-11-17 PROCEDURE — 99214 OFFICE O/P EST MOD 30 MIN: CPT | Mod: 95

## 2023-11-17 RX ORDER — METFORMIN ER 500 MG 500 MG/1
500 TABLET ORAL
Qty: 180 | Refills: 3 | Status: ACTIVE | COMMUNITY
Start: 1900-01-01 | End: 1900-01-01

## 2024-04-16 LAB
HBA1C MFR BLD HPLC: 8.2
LDLC SERPL DIRECT ASSAY-MCNC: 73
TSH SERPL-ACNC: 0.57

## 2024-04-17 ENCOUNTER — APPOINTMENT (OUTPATIENT)
Dept: ENDOCRINOLOGY | Facility: CLINIC | Age: 84
End: 2024-04-17
Payer: MEDICARE

## 2024-04-17 DIAGNOSIS — E03.9 HYPOTHYROIDISM, UNSPECIFIED: ICD-10-CM

## 2024-04-17 DIAGNOSIS — E11.9 TYPE 2 DIABETES MELLITUS W/OUT COMPLICATIONS: ICD-10-CM

## 2024-04-17 PROCEDURE — G2211 COMPLEX E/M VISIT ADD ON: CPT

## 2024-04-17 PROCEDURE — 99214 OFFICE O/P EST MOD 30 MIN: CPT

## 2024-04-17 NOTE — ASSESSMENT
[FreeTextEntry1] : DM type 2, acceptable control by history, requires verification by lab tests Hypothyroid, likely euthyroid on replacement

## 2024-04-17 NOTE — HISTORY OF PRESENT ILLNESS
[Home] : at home, [unfilled] , at the time of the visit. [Medical Office: ([facility])___] : at the medical office located in  [Verbal consent obtained from patient] : the patient, [unfilled] [FreeTextEntry1] : History: lung mass was found on CT scan and has a scheduled appointment with lung specialist. She also had a brain MRI, PET scan and Echo done. PET scan was negative, CT scan had no concerning findings. ECHO negative. Lung finding reading as possible scarring and no mass currently.  Brain MRI was wnl.   hospitalized with syncope. Unconscious for several minutes, and after episodes had strange tongue and limb movements. Prior episodes years ago. Not provoked by posture or activity. Sensation of epigastric burning preceding episodes. MRI of brain and CT unremarkable. Has loop recorder and neurologic followup ongoing weight loss despite good appetite. Underlying COPD. Missed some doses of T4 while in hospital, and TSH malorie to 12 s/p shingles Quality: Type 2 DM Severity: controlled Duration: over 8 years Onset: routine labs Modifying Factors: Better with medication  Associated Symptoms:   Current Regimen: Metformin  mg once in am  - Synthroid 125 mcg (VINNY) 1/2 tab - taking appropriate  6/27/22 TSH 1.3, Free T4 1.1  Self blood sugar monitoring: 3 times a week, no meter, no logs per pt. before breakfast blood sugar average 125-130 Current  - ate bagel   6/27/22 A1C 7.3  exercise: walks not as much in the winter, walks up and down the stairs daily

## 2024-08-16 ENCOUNTER — RX RENEWAL (OUTPATIENT)
Age: 84
End: 2024-08-16

## 2024-10-15 ENCOUNTER — APPOINTMENT (OUTPATIENT)
Dept: ENDOCRINOLOGY | Facility: CLINIC | Age: 84
End: 2024-10-15
Payer: MEDICARE

## 2024-10-15 DIAGNOSIS — E03.9 HYPOTHYROIDISM, UNSPECIFIED: ICD-10-CM

## 2024-10-15 DIAGNOSIS — E11.9 TYPE 2 DIABETES MELLITUS W/OUT COMPLICATIONS: ICD-10-CM

## 2024-10-15 PROCEDURE — G2211 COMPLEX E/M VISIT ADD ON: CPT

## 2024-10-15 PROCEDURE — 99214 OFFICE O/P EST MOD 30 MIN: CPT

## 2024-10-15 RX ORDER — SITAGLIPTIN 100 MG/1
100 TABLET, FILM COATED ORAL DAILY
Qty: 90 | Refills: 3 | Status: ACTIVE | COMMUNITY
Start: 2024-10-15 | End: 1900-01-01

## 2025-05-19 LAB
HBA1C MFR BLD HPLC: 6.6
LDLC SERPL DIRECT ASSAY-MCNC: 149
TSH SERPL-ACNC: 2.37

## 2025-05-20 ENCOUNTER — NON-APPOINTMENT (OUTPATIENT)
Age: 85
End: 2025-05-20

## 2025-05-22 ENCOUNTER — APPOINTMENT (OUTPATIENT)
Dept: ENDOCRINOLOGY | Facility: CLINIC | Age: 85
End: 2025-05-22
Payer: MEDICARE

## 2025-05-22 DIAGNOSIS — E03.9 HYPOTHYROIDISM, UNSPECIFIED: ICD-10-CM

## 2025-05-22 DIAGNOSIS — E11.9 TYPE 2 DIABETES MELLITUS W/OUT COMPLICATIONS: ICD-10-CM

## 2025-05-22 PROCEDURE — 99212 OFFICE O/P EST SF 10 MIN: CPT | Mod: 93

## 2025-08-04 ENCOUNTER — RX RENEWAL (OUTPATIENT)
Age: 85
End: 2025-08-04